# Patient Record
Sex: FEMALE | Race: WHITE | NOT HISPANIC OR LATINO | Employment: UNEMPLOYED | URBAN - METROPOLITAN AREA
[De-identification: names, ages, dates, MRNs, and addresses within clinical notes are randomized per-mention and may not be internally consistent; named-entity substitution may affect disease eponyms.]

---

## 2020-05-05 ENCOUNTER — HOSPITAL ENCOUNTER (EMERGENCY)
Facility: HOSPITAL | Age: 25
Discharge: HOME | End: 2020-05-05
Admitting: EMERGENCY MEDICINE

## 2020-05-05 VITALS
HEIGHT: 63 IN | DIASTOLIC BLOOD PRESSURE: 70 MMHG | BODY MASS INDEX: 23.95 KG/M2 | HEART RATE: 90 BPM | SYSTOLIC BLOOD PRESSURE: 107 MMHG | OXYGEN SATURATION: 100 % | TEMPERATURE: 98 F | WEIGHT: 135.14 LBS | RESPIRATION RATE: 13 BRPM

## 2020-05-05 DIAGNOSIS — R68.84 JAW PAIN: Primary | ICD-10-CM

## 2020-05-05 PROCEDURE — 99282 EMERGENCY DEPT VISIT SF MDM: CPT

## 2020-05-05 PROCEDURE — 99283 EMERGENCY DEPT VISIT LOW MDM: CPT

## 2020-05-05 RX ORDER — IBUPROFEN 800 MG/1
800 TABLET ORAL EVERY 8 HOURS PRN
Qty: 9 TABLET | Refills: 0 | Status: SHIPPED | OUTPATIENT
Start: 2020-05-05

## 2024-03-05 ENCOUNTER — PROCEDURE VISIT (OUTPATIENT)
Dept: FAMILY MEDICINE CLINIC | Facility: CLINIC | Age: 29
End: 2024-03-05

## 2024-03-05 VITALS
WEIGHT: 125 LBS | DIASTOLIC BLOOD PRESSURE: 70 MMHG | SYSTOLIC BLOOD PRESSURE: 108 MMHG | BODY MASS INDEX: 22.15 KG/M2 | OXYGEN SATURATION: 99 % | HEIGHT: 63 IN | HEART RATE: 50 BPM

## 2024-03-05 DIAGNOSIS — G89.29 CHRONIC ELBOW PAIN, RIGHT: ICD-10-CM

## 2024-03-05 DIAGNOSIS — M99.02 SOMATIC DYSFUNCTION OF THORACOLUMBAR REGION: Primary | ICD-10-CM

## 2024-03-05 DIAGNOSIS — M25.521 CHRONIC ELBOW PAIN, RIGHT: ICD-10-CM

## 2024-03-05 PROBLEM — G44.219 EPISODIC TENSION-TYPE HEADACHE, NOT INTRACTABLE: Status: ACTIVE | Noted: 2024-03-05

## 2024-03-05 PROBLEM — Z00.00: Status: RESOLVED | Noted: 2017-06-30 | Resolved: 2024-03-05

## 2024-03-05 RX ORDER — FLUCONAZOLE 200 MG/1
200 TABLET ORAL
COMMUNITY
Start: 2024-02-29 | End: 2024-03-05

## 2024-03-05 NOTE — PATIENT INSTRUCTIONS
Post Acupuncture Clinic Treatment Instructions:  Increase water intake today. May consider epsom salt soak or oral magnesium.     There is a possibility of lightheadedness or euphoria during or after treatment. Home care: no heavy exercise today, avoid alcohol, food that is difficult for you to digest for the next 24 hours. Take all prescribed medication and discuss any changes with your doctor. Pain may increase/decrease/or remain the same in the next 2-4 days. ASP auricular needles (if applicable) will fall out on their own in the next 3-7 days. Watch for signs/symptoms of infection - redness/pus/swelling and f/u ASAP or in the ER and to remove ear needles if that happens.    Recommend tomorrow ok to resume all other normal physical activity as tolerated including previously prescribed PT or CBT exercises except for restrictions noted above. The intention is that this you can use this period of to maximize effect of PT or CBT and begin to lengthen the interval between acupuncture treatments     Note:   See procedure billing codes in followup, you may submit to your insurance for reimbursement if desired using those codes.       Acupuncture clinic billing    Electroacupuncture: $57.20  Acupuncture w/o electric stim 1st: $49.40  Acupuncture w/o electric stim 2nd: $37.05  Trigger point needling 1-2 muscles: $79.30  Trigger point needling 3+ muscles: $85.80  Physical manipulation (cupping, gua sha, etc): $35.75

## 2024-03-05 NOTE — PROGRESS NOTES
"Chief Complaint  Chief Complaint   Patient presents with    Acupuncture       Subjective    History of Present Illness  iNecy Greenwood is a 29 y.o. female presents to [facility] with years of low back pain. She is very physically active- running, climbing, surfing and always feels like her back has a kink in it. 2 years ago she dislocated her R elbow and is gets stiff with repetitive lifting or climbing. She has headaches- will have episodes where she has frequent headaches lasting a month or so, and then go months in between. Not currently having headaches. She has done chripractor and PT for the back and elbow.     Previous acupuncture treatments: dry needling at PT.     Patient goal for acupuncture: wants to try it to see if she can get some relief    Patient does not have a phobia of needles or history of syncope with blood draws.       Current Outpatient Medications on File Prior to Visit   Medication Sig Dispense Refill    [DISCONTINUED] fluconazole (DIFLUCAN) 200 MG tablet Take 1 tablet by mouth Every 3 (Three) Days.      [DISCONTINUED] nitrofurantoin, macrocrystal-monohydrate, (Macrobid) 100 MG capsule Take 1 capsule by mouth 2 (Two) Times a Day. 14 capsule 0     No current facility-administered medications on file prior to visit.       Past Medical History:   Diagnosis Date    Encounter for blood test for routine general physical examination 06/30/2017       History reviewed. No pertinent surgical history.    History reviewed. No pertinent family history.    Objective   Vitals:    03/05/24 0845   BP: 108/70   Pulse: 50   SpO2: 99%   Weight: 56.7 kg (125 lb)   Height: 160 cm (63\")        Physical Exam   Gen: Vitals wnl, NAD  NMSK:  Speech is clear & lucid. Normal gait. No focal neuromuscular deficits. Normal sensation.  SKIN: No bleeding, signs of infection, or ecchymosis.      Procedure    Patient presents for evaluation and treatment with medical " acupuncture. Routine risks and benefits for treatment with medical acupuncture and associated modalities were reviewed in detail with the patient. Specifically, these included bleeding and infection, worsening flare of symptoms, possible lightheadedness/dizziness, euphoric reaction, and others specific to the treatment. Benefits to include pain relief and as appropriate to the treatment modalities such as improved energy and mood. Treatment duration to be determined by response. Consent signed and placed in chart if not already done at previous visit.     All sites cleaned with alcohol wipe x 1.     ELECTROACUPUNCTURE    Tanya Chain: Spring Ten 0.25×40 mm needles x 12 inserted into tenderpoints palpated in the thoracic and lumbar paraspinals. Alternating negative and positive leads were attached and area was stimulated at 15 Hz x 20 minutes. Needles removed in the usual fashion and placed in the sharps container.    MANUAL THERAPY    Gua Sha: Gua Sha scraping performed over suboccipital, thoracolumbar paraspinal muscles and trapezius. . Lotion used to obtain desired robust erythematous response.    Cupping: Hansol Bu-Hang plastic cups with use of suction device were applied to areas of muscle hypertonicity over thoracolumbar paraspinal and trapezoid regions and R elbow. Lotion used to mobilize cups to obtain desired robust erythematous response.    Pain pre-treatment:  1/10  Pain post-treatment:  1/10- improved muscle tension in back      Assessment and Plan  Niecy Greenwood is a 29 y.o. female presents to [facility]. Patient did tolerate acupuncture treatment today. There was improvement in patient symptoms. There were no adverse reactions.     Diagnoses and all orders for this visit:    1. Somatic dysfunction of thoracolumbar region (Primary)    2. Chronic elbow pain, right           Patient voiced understanding and agreement with plan of care and had no further  questions or concerns at this time.     I spent 40 minutes on this encounter, including chart review of any relevant previous encounters, labs, and imaging;  >%50% of encounter spent face-to-face with the patient. Acupuncture clinic is cash pay at the time of encounter, and not to be billed to insurance.     Oriana Hercules MD  Family Medicine  [facility]    Follow Up  Return in about 4 weeks (around 4/2/2024) for acupuncture billing- [id number], [id number].    Patient Instructions   Post Acupuncture Clinic Treatment Instructions:  Increase water intake today. May consider epsom salt soak or oral magnesium.     There is a possibility of lightheadedness or euphoria during or after treatment. Home care: no heavy exercise today, avoid alcohol, food that is difficult for you to digest for the next 24 hours. Take all prescribed medication and discuss any changes with your doctor. Pain may increase/decrease/or remain the same in the next 2-4 days. ASP auricular needles (if applicable) will fall out on their own in the next 3-7 days. Watch for signs/symptoms of infection - redness/pus/swelling and f/u ASAP or in the ER and to remove ear needles if that happens.    Recommend tomorrow ok to resume all other normal physical activity as tolerated including previously prescribed PT or CBT exercises except for restrictions noted above. The intention is that this you can use this period of to maximize effect of PT or CBT and begin to lengthen the interval between acupuncture treatments     Note:   See procedure billing codes in followup, you may submit to your insurance for reimbursement if desired using those codes.       Acupuncture clinic billing    Electroacupuncture: $57.20  Acupuncture w/o electric stim 1st: $49.40  Acupuncture w/o electric stim 2nd: $37.05  Trigger point needling 1-2 muscles: $79.30  Trigger point needling 3+ muscles: $85.80  Physical manipulation (cupping, gua sha, etc): $35.75